# Patient Record
Sex: FEMALE | Race: BLACK OR AFRICAN AMERICAN | NOT HISPANIC OR LATINO | URBAN - METROPOLITAN AREA
[De-identification: names, ages, dates, MRNs, and addresses within clinical notes are randomized per-mention and may not be internally consistent; named-entity substitution may affect disease eponyms.]

---

## 2024-07-17 ENCOUNTER — HOSPITAL ENCOUNTER (EMERGENCY)
Facility: HOSPITAL | Age: 69
Discharge: HOME | End: 2024-07-17
Attending: STUDENT IN AN ORGANIZED HEALTH CARE EDUCATION/TRAINING PROGRAM
Payer: MEDICARE

## 2024-07-17 VITALS
RESPIRATION RATE: 18 BRPM | OXYGEN SATURATION: 96 % | DIASTOLIC BLOOD PRESSURE: 77 MMHG | SYSTOLIC BLOOD PRESSURE: 155 MMHG | TEMPERATURE: 98 F | HEIGHT: 71 IN | WEIGHT: 250 LBS | BODY MASS INDEX: 35 KG/M2 | HEART RATE: 62 BPM

## 2024-07-17 DIAGNOSIS — S52.92XA CLOSED FRACTURE OF DISTAL END OF LEFT FOREARM, INITIAL ENCOUNTER: Primary | ICD-10-CM

## 2024-07-17 DIAGNOSIS — T14.90XA TRAUMA: ICD-10-CM

## 2024-07-17 PROCEDURE — 25000003 PHARM REV CODE 250

## 2024-07-17 PROCEDURE — 29105 APPLICATION LONG ARM SPLINT: CPT | Mod: LT

## 2024-07-17 PROCEDURE — 99283 EMERGENCY DEPT VISIT LOW MDM: CPT | Mod: 25

## 2024-07-17 RX ORDER — IBUPROFEN 600 MG/1
600 TABLET ORAL
Status: DISCONTINUED | OUTPATIENT
Start: 2024-07-17 | End: 2024-07-17 | Stop reason: HOSPADM

## 2024-07-17 RX ORDER — OXYCODONE HYDROCHLORIDE 5 MG/1
5 TABLET ORAL
Status: COMPLETED | OUTPATIENT
Start: 2024-07-17 | End: 2024-07-17

## 2024-07-17 RX ORDER — OXYCODONE HYDROCHLORIDE 5 MG/1
5 TABLET ORAL EVERY 12 HOURS PRN
Qty: 10 TABLET | Refills: 0 | Status: SHIPPED | OUTPATIENT
Start: 2024-07-17 | End: 2024-07-22

## 2024-07-17 RX ORDER — LIDOCAINE HYDROCHLORIDE 10 MG/ML
10 INJECTION, SOLUTION EPIDURAL; INFILTRATION; INTRACAUDAL; PERINEURAL
Status: COMPLETED | OUTPATIENT
Start: 2024-07-17 | End: 2024-07-17

## 2024-07-17 RX ADMIN — LIDOCAINE HYDROCHLORIDE 100 MG: 10 SOLUTION INTRAVENOUS at 05:07

## 2024-07-17 RX ADMIN — OXYCODONE 5 MG: 5 TABLET ORAL at 04:07

## 2024-07-17 NOTE — Clinical Note
"Valentina "Valentina" Mingo was seen and treated in our emergency department on 7/17/2024.  She may return to work on 07/19/2024.       If you have any questions or concerns, please don't hesitate to call.      Gelacio Pablo, DO"

## 2024-07-17 NOTE — ED NOTES
Pt to ED after falling off of a step ladder onto left wrist. Obvious deformity. Denies hitting head or blood thinners.      LOC: The patient is awake, alert and aware of environment with an appropriate affect, the patient is oriented x 3 and speaking appropriately.  APPEARANCE: Patient resting comfortably and in no acute distress, patient is clean and well groomed, patient's clothing is properly fastened.  SKIN: The skin is warm and dry, color consistent with ethnicity, patient has normal skin turgor and moist mucus membranes, skin intact, no breakdown or bruising noted.  MUSCULOSKELETAL: Patient moving all extremities spontaneously, left wrist deformity.  RESPIRATORY: Airway is open and patent, respirations are spontaneous, patient has a normal effort and rate, no accessory muscle use noted.  CARDIAC: Patient has a normal rate and regular rhythm, no periphreal edema noted, capillary refill < 3 seconds.  ABDOMEN: Soft and non tender to palpation, no distention noted, normoactive bowel sounds present in all four quadrants.  NEUROLOGIC:  facial expression is symmetrical, patient moving all extremities spontaneously, normal sensation in all extremities when touched with a finger.  Follows all commands appropriately.

## 2024-07-17 NOTE — ED NOTES
+ deformity to left wrist; guarding, severely limited rom to left wrist; has brace on from home and ice pack from home. Pt offered new ice pack in triage; declines

## 2024-07-17 NOTE — Clinical Note
"Valentina "Valentina" Mingo was seen and treated in our emergency department on 7/17/2024.  She may return to work after being cleared by follow-up physician 07/19/2024.  Return to work without sling/splint as per orthopedics     If you have any questions or concerns, please don't hesitate to call.      Gelacio Pablo, DO"

## 2024-07-17 NOTE — ED PROVIDER NOTES
Encounter Date: 7/17/2024       History     Chief Complaint   Patient presents with    Fall     Fall today off step ladder while trying to put up curtains. Did not hit head; no loc. Not on a blood thinner. C/o left wrist injury    Wrist Injury     The history is provided by the patient.     Patient was a 68-year-old female with a past medical history of HTN, HLD, anxiety who presents s/p fall off of a step ladder, now complaining of left wrist pain.  She states that she was hanging up curtains on a step ladder when she accidentally stepped down from the ladder and missed 1 of the steps, causing her to fall back.  In the action of falling back, she hit her left wrist with her hand outstretched, and immediately felt pain.  She notes this happened approximately 30 minutes prior to arriving to the emergency department.  She has not taken any pain medicine.  She notes that moving her fingers on the left hand hurt but she can still do it.  She states that she still has sensation in the entirety of her left upper extremity.  Other than her left wrist she is denies having pain anywhere else, specifically in her left elbow/shoulder as well as any other extremity.  She did not hit her head during the fall and did not lose consciousness.  She notes that she did not have any chest pain or headache just prior to the fall.  She has not taking any blood thinning medications.  Prior to the incident she was in her baseline state of health.  Denying all other symptoms at this time.      Review of patient's allergies indicates:   Allergen Reactions    Amlodipine benzoate Swelling    Losartan Swelling    Hydralazine Other (See Comments)     Facial swelling     No past medical history on file.  No past surgical history on file.  No family history on file.       Physical Exam     Initial Vitals   BP Pulse Resp Temp SpO2   07/17/24 1531 07/17/24 1528 07/17/24 1528 07/17/24 1528 07/17/24 1528   (!) 164/83 64 20 98 °F (36.7 °C) 95 %       MAP       --                Physical Exam    Nursing note and vitals reviewed.  Constitutional: She appears well-developed. No distress.   HENT:   Head: Normocephalic and atraumatic.   Mouth/Throat: Oropharynx is clear and moist and mucous membranes are normal.   Eyes: EOM are normal. Pupils are equal, round, and reactive to light.   Neck:   Normal range of motion.  Cardiovascular:  Normal rate and regular rhythm.           Pulmonary/Chest: No respiratory distress.   Abdominal: She exhibits no distension.   Musculoskeletal:      Cervical back: Normal range of motion.      Comments: Clear deformity in the distal portion of the patient's left forearm/wrist.  The area is extremely tender to palpation.  She is still able to move her fingers at the PIP and DIPs, however doing so induces pain.  Minimal range of motion both active/passive at the left wrist.  There is no tenderness or pain when actively ranging her left elbow or left shoulder.     Neurological: She is alert and oriented to person, place, and time.   Sensation is maintained throughout the entirety of the left upper extremity, both proximal and distal to where the patient's injury occurred at the left wrist.  No other obvious focal neurologic deficits are present.   Skin: Skin is warm.   Cap blueberry refill is maintain that the most distal portion of the patient's left upper extremity.  Specifically, it was noted that the cap refill is normal at all 5 fingertips on the left upper extremity.   Psychiatric: She has a normal mood and affect. Her behavior is normal. Thought content normal.         ED Course   Splint Application    Date/Time: 7/17/2024 5:42 PM    Performed by: Niko Romero MD  Authorized by: Gelacio Pablo DO  Location details: left wrist  Splint type: sugar tong  Supplies used: cotton padding, elastic bandage and plaster  Post-procedure: The splinted body part was neurovascularly unchanged following the procedure.  Patient tolerance:  Patient tolerated the procedure well with no immediate complications        Labs Reviewed - No data to display         Imaging Results              X-Ray Forearm Left (Final result)  Result time 07/17/24 17:02:00      Final result by Barney Aceves MD (07/17/24 17:02:00)                   Impression:      1. Fractures of the distal radius and ulna as described.      Electronically signed by: Barney Aceves MD  Date:    07/17/2024  Time:    17:02               Narrative:    EXAMINATION:  XR WRIST COMPLETE 3 VIEWS LEFT; XR HAND 2 VIEW LEFT; XR FOREARM LEFT    CLINICAL HISTORY:  Injury, unspecified, initial encounter    TECHNIQUE:  PA, lateral, and oblique views of the left wrist were performed.  Three views left hand, two views left forearm.    COMPARISON:  None    FINDINGS:  Three views left wrist, three views left hand, two views left forearm.    There is an acute impacted displaced and intra-articular fracture involving the distal aspect of the left radius.  There is small avulsion fracture of the ulnar styloid.  There is osteopenia.  The wrist appears intact noting edema overlies the dorsal aspect of the wrist.  There are degenerative changes of the hand.  There are degenerative changes of the 1st carpal metacarpal joint.  The remaining aspects of the radius and ulna are intact.  The elbow is intact.  There are degenerative changes of the medial and lateral humeral epicondyles.                                       X-Ray Wrist Complete Left (Final result)  Result time 07/17/24 17:02:00      Final result by Barney Aceves MD (07/17/24 17:02:00)                   Impression:      1. Fractures of the distal radius and ulna as described.      Electronically signed by: Barney Aceves MD  Date:    07/17/2024  Time:    17:02               Narrative:    EXAMINATION:  XR WRIST COMPLETE 3 VIEWS LEFT; XR HAND 2 VIEW LEFT; XR FOREARM LEFT    CLINICAL HISTORY:  Injury, unspecified, initial  encounter    TECHNIQUE:  PA, lateral, and oblique views of the left wrist were performed.  Three views left hand, two views left forearm.    COMPARISON:  None    FINDINGS:  Three views left wrist, three views left hand, two views left forearm.    There is an acute impacted displaced and intra-articular fracture involving the distal aspect of the left radius.  There is small avulsion fracture of the ulnar styloid.  There is osteopenia.  The wrist appears intact noting edema overlies the dorsal aspect of the wrist.  There are degenerative changes of the hand.  There are degenerative changes of the 1st carpal metacarpal joint.  The remaining aspects of the radius and ulna are intact.  The elbow is intact.  There are degenerative changes of the medial and lateral humeral epicondyles.                                       X-Ray Hand 2 View Left (Final result)  Result time 07/17/24 17:02:00      Final result by Barney Aceves MD (07/17/24 17:02:00)                   Impression:      1. Fractures of the distal radius and ulna as described.      Electronically signed by: Barney Aceves MD  Date:    07/17/2024  Time:    17:02               Narrative:    EXAMINATION:  XR WRIST COMPLETE 3 VIEWS LEFT; XR HAND 2 VIEW LEFT; XR FOREARM LEFT    CLINICAL HISTORY:  Injury, unspecified, initial encounter    TECHNIQUE:  PA, lateral, and oblique views of the left wrist were performed.  Three views left hand, two views left forearm.    COMPARISON:  None    FINDINGS:  Three views left wrist, three views left hand, two views left forearm.    There is an acute impacted displaced and intra-articular fracture involving the distal aspect of the left radius.  There is small avulsion fracture of the ulnar styloid.  There is osteopenia.  The wrist appears intact noting edema overlies the dorsal aspect of the wrist.  There are degenerative changes of the hand.  There are degenerative changes of the 1st carpal metacarpal joint.  The remaining  aspects of the radius and ulna are intact.  The elbow is intact.  There are degenerative changes of the medial and lateral humeral epicondyles.                                    X-Rays:   Independently Interpreted Readings:   Other Readings:  Patient's left hand/wrist/forearm x-rays were independently interpreted by me:    Medications   ibuprofen tablet 600 mg (600 mg Oral Not Given 7/17/24 1600)   LIDOcaine (PF) 10 mg/ml (1%) injection 100 mg (has no administration in time range)   oxyCODONE immediate release tablet 5 mg (5 mg Oral Given 7/17/24 1602)     Medical Decision Making  Patient was a 68-year-old female who presents s/p fall now complaining of left rib pain.  Differential diagnosis includes but is not limited to left wrist contusion, scaphoid fracture, Colles fracture, radial fracture, ulnar fracture.  Upon my initial evaluation of the patient, she would overall reassuring vital signs but a clearly concerning physical exam.  I gave her a dose of oxycodone/Motrin for pain control.  We will obtain an x-ray of the left hand/wrist/forearm.  At this time, I have low clinical concern for medical etiology being involved as the culprit of the fall, but rather think that it was just a misstep secondary to her climbing down the step ladder.  She also has no focal neurologic deficits and claims to not have hit her head or have any headache, so I will abstain from obtaining head scans at this time.    Was significant for a mildly posterior displaced radial fracture.  I subsequently placed a sugar-tong splint without complication.  It that she is from [place] and had already set up an appointment to see orthopedic surgery there.  She remained hemodynamically stable while here in the emergency department, I feel she is safe for discharge at this time.    Amount and/or Complexity of Data Reviewed  Independent Historian: spouse  External Data Reviewed: notes.  Radiology: ordered.    Risk  OTC drugs.  Prescription drug  management.                                Clinical Impression:  Final diagnoses:  [T14.90XA] Trauma  [S52.92XA] Closed fracture of distal end of left forearm, initial encounter (Primary)                 Niko Romero MD  Resident  07/17/24 [id number]

## 2024-07-17 NOTE — DISCHARGE INSTRUCTIONS
You were evaluated in the emergency department today for wrist fracture / forearm fracture.  Although there were no findings of concern to necessitate admission to the hospital or warrant immediate surgical intervention, disease exists on a spectrum and your disease process may progress.  If this is the case, please watch your symptoms and return to the emergency department if you feel worse and are unable to discuss care with your primary care doctor in follow up in the next several days.  Specific information regarding your complaint has been provided.  Thank you for choosing [facility]!    You have a broken bone in your wrist / forearm.  This can be treated with splinting for now (do not take splint off until you see the orthopedist.).  You can take ibuprofen or tylenol for pain and oxycodone for severe pain, unless you doctor has directed you not to take any of these medications.    You were placed in a splint and you can use the sling for protection. Follow up with our orthopedic specialists or your own in [place].  Pain medication as been provided for you.    Imaging Results              X-Ray Forearm Left (Final result)  Result time 07/17/24 17:02:00      Final result by Barney Aceves MD (07/17/24 17:02:00)                   Impression:      1. Fractures of the distal radius and ulna as described.      Electronically signed by: Barney Aceves MD  Date:    07/17/2024  Time:    17:02               Narrative:    EXAMINATION:  XR WRIST COMPLETE 3 VIEWS LEFT; XR HAND 2 VIEW LEFT; XR FOREARM LEFT    CLINICAL HISTORY:  Injury, unspecified, initial encounter    TECHNIQUE:  PA, lateral, and oblique views of the left wrist were performed.  Three views left hand, two views left forearm.    COMPARISON:  None    FINDINGS:  Three views left wrist, three views left hand, two views left forearm.    There is an acute impacted displaced and intra-articular fracture involving the distal aspect of the left radius.  There is  small avulsion fracture of the ulnar styloid.  There is osteopenia.  The wrist appears intact noting edema overlies the dorsal aspect of the wrist.  There are degenerative changes of the hand.  There are degenerative changes of the 1st carpal metacarpal joint.  The remaining aspects of the radius and ulna are intact.  The elbow is intact.  There are degenerative changes of the medial and lateral humeral epicondyles.                                       X-Ray Wrist Complete Left (Final result)  Result time 07/17/24 17:02:00      Final result by Barney Aceves MD (07/17/24 17:02:00)                   Impression:      1. Fractures of the distal radius and ulna as described.      Electronically signed by: Barney Aceves MD  Date:    07/17/2024  Time:    17:02               Narrative:    EXAMINATION:  XR WRIST COMPLETE 3 VIEWS LEFT; XR HAND 2 VIEW LEFT; XR FOREARM LEFT    CLINICAL HISTORY:  Injury, unspecified, initial encounter    TECHNIQUE:  PA, lateral, and oblique views of the left wrist were performed.  Three views left hand, two views left forearm.    COMPARISON:  None    FINDINGS:  Three views left wrist, three views left hand, two views left forearm.    There is an acute impacted displaced and intra-articular fracture involving the distal aspect of the left radius.  There is small avulsion fracture of the ulnar styloid.  There is osteopenia.  The wrist appears intact noting edema overlies the dorsal aspect of the wrist.  There are degenerative changes of the hand.  There are degenerative changes of the 1st carpal metacarpal joint.  The remaining aspects of the radius and ulna are intact.  The elbow is intact.  There are degenerative changes of the medial and lateral humeral epicondyles.                                       X-Ray Hand 2 View Left (Final result)  Result time 07/17/24 17:02:00      Final result by Barney Aceves MD (07/17/24 17:02:00)                   Impression:      1. Fractures of  the distal radius and ulna as described.      Electronically signed by: Barney Aceves MD  Date:    07/17/2024  Time:    17:02               Narrative:    EXAMINATION:  XR WRIST COMPLETE 3 VIEWS LEFT; XR HAND 2 VIEW LEFT; XR FOREARM LEFT    CLINICAL HISTORY:  Injury, unspecified, initial encounter    TECHNIQUE:  PA, lateral, and oblique views of the left wrist were performed.  Three views left hand, two views left forearm.    COMPARISON:  None    FINDINGS:  Three views left wrist, three views left hand, two views left forearm.    There is an acute impacted displaced and intra-articular fracture involving the distal aspect of the left radius.  There is small avulsion fracture of the ulnar styloid.  There is osteopenia.  The wrist appears intact noting edema overlies the dorsal aspect of the wrist.  There are degenerative changes of the hand.  There are degenerative changes of the 1st carpal metacarpal joint.  The remaining aspects of the radius and ulna are intact.  The elbow is intact.  There are degenerative changes of the medial and lateral humeral epicondyles.